# Patient Record
Sex: MALE | Race: BLACK OR AFRICAN AMERICAN | NOT HISPANIC OR LATINO | URBAN - METROPOLITAN AREA
[De-identification: names, ages, dates, MRNs, and addresses within clinical notes are randomized per-mention and may not be internally consistent; named-entity substitution may affect disease eponyms.]

---

## 2022-10-17 ENCOUNTER — WEB ENCOUNTER (OUTPATIENT)
Dept: URBAN - METROPOLITAN AREA CLINIC 92 | Facility: CLINIC | Age: 82
End: 2022-10-17

## 2022-10-17 ENCOUNTER — OFFICE VISIT (OUTPATIENT)
Dept: URBAN - METROPOLITAN AREA CLINIC 92 | Facility: CLINIC | Age: 82
End: 2022-10-17
Payer: MEDICARE

## 2022-10-17 VITALS
WEIGHT: 210 LBS | DIASTOLIC BLOOD PRESSURE: 72 MMHG | TEMPERATURE: 97 F | BODY MASS INDEX: 26.95 KG/M2 | SYSTOLIC BLOOD PRESSURE: 142 MMHG | HEART RATE: 68 BPM | HEIGHT: 74 IN

## 2022-10-17 DIAGNOSIS — R19.7 DIARRHEA, UNSPECIFIED: ICD-10-CM

## 2022-10-17 PROCEDURE — 99204 OFFICE O/P NEW MOD 45 MIN: CPT

## 2022-10-17 RX ORDER — FUROSEMIDE 40 MG/1
1 TABLET TABLET ORAL ONCE A DAY
Status: ACTIVE | COMMUNITY

## 2022-10-17 RX ORDER — KRILL/OM-3/DHA/EPA/PHOSPHO/AST 1000-230MG
1 TABLET CAPSULE ORAL ONCE A DAY
Status: ACTIVE | COMMUNITY

## 2022-10-17 RX ORDER — LABETALOL HYDROCHLORIDE 200 MG/1
1 TABLET TABLET ORAL TWICE A DAY
Status: ACTIVE | COMMUNITY

## 2022-10-17 RX ORDER — BICALUTAMIDE 50 MG/1
1 TABLET TABLET ORAL ONCE A DAY
Status: ACTIVE | COMMUNITY

## 2022-10-17 NOTE — HPI-TODAY'S VISIT:
83 yo male presents today with complaints of diarrhea.  He was last seen in 2012 due to chronic diarrhea and screening colonoscopy.  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  Two 3 mm hyperplastic polyps were also found, small IH.  He did follow-up after the procedure and it was suggested he start Rowasa enema as well as serologies with CBC, CRP, sed rate and Prometheus panel at that time patient said he was unable to take enema due to high co-pay.  At that time he was experiencing 5-6 liquid bowel movements per day without any overt bleeding.  Prometheus panel was not consistent with inflammatory bowel disease however pathology and endoscopic finding is highly suggestive.  Has not his other labs were also normal he failed to follow-up after this visit.  Currently states he has been having diarrhea for 3 weeks and saw his PCP who gave him Cipro and Lomotil.  He states he did not complete his Cipro course.  He states if he has 1 bowel movement that is loose he will not have any more t/o the day but states if he does not he will have some incontinence after coughing or sneezing. He cannot tell me how many exact BM he has. Denies melena, hematochezia.  He has tried imodium prn with relief. He has had some weight loss due to decreased intake of food since he has diarrhea right after eating.  Denies any new medications or antibiotics since onset, recent travel.  Denies abdominal pain, nausea, vomiting, fevers, chills.  He has not had any stool studies.  He denies any family history of colon cancer, colon polyps, ulcerative colitis.  He does note that his son has Crohn's disease.  Per patient he had a colonoscopy in 2019 with Dr. Paez at [place] and he was told that he had 2 polyps and thinks he was supposed to repeat this in 5 years.  He does not know why he was seen a colorectal doctor.  He notes that prior to this episode he would have 1 bowel movement daily that was soft and formed and cannot member the last time he did have diarrhea.  He denies any upper GI symptoms, NSAID use.

## 2022-10-18 ENCOUNTER — LAB OUTSIDE AN ENCOUNTER (OUTPATIENT)
Dept: URBAN - METROPOLITAN AREA CLINIC 92 | Facility: CLINIC | Age: 82
End: 2022-10-18

## 2022-10-19 ENCOUNTER — TELEPHONE ENCOUNTER (OUTPATIENT)
Dept: URBAN - METROPOLITAN AREA CLINIC 23 | Facility: CLINIC | Age: 82
End: 2022-10-19

## 2022-10-20 ENCOUNTER — TELEPHONE ENCOUNTER (OUTPATIENT)
Dept: URBAN - METROPOLITAN AREA CLINIC 7 | Facility: CLINIC | Age: 82
End: 2022-10-20

## 2022-10-25 ENCOUNTER — TELEPHONE ENCOUNTER (OUTPATIENT)
Dept: URBAN - METROPOLITAN AREA CLINIC 7 | Facility: CLINIC | Age: 82
End: 2022-10-25

## 2022-10-25 LAB — GASTROINTESTINAL PATHOGEN: (no result)

## 2022-10-31 ENCOUNTER — TELEPHONE ENCOUNTER (OUTPATIENT)
Dept: URBAN - METROPOLITAN AREA CLINIC 92 | Facility: CLINIC | Age: 82
End: 2022-10-31

## 2022-10-31 RX ORDER — POLYETHYLENE GLYCOL 3350, SODIUM SULFATE ANHYDROUS, SODIUM BICARBONATE, SODIUM CHLORIDE, POTASSIUM CHLORIDE 236; 22.74; 6.74; 5.86; 2.97 G/4L; G/4L; G/4L; G/4L; G/4L
AS DIRECTED POWDER, FOR SOLUTION ORAL 1
Qty: 1 | Refills: 0 | OUTPATIENT
Start: 2022-10-31 | End: 2022-11-01

## 2022-11-01 LAB
CALPROTECTIN, FECAL: 2350
PANCREATIC ELASTASE, FECAL: >500

## 2022-11-17 ENCOUNTER — TELEPHONE ENCOUNTER (OUTPATIENT)
Dept: URBAN - METROPOLITAN AREA CLINIC 92 | Facility: CLINIC | Age: 82
End: 2022-11-17

## 2022-11-18 ENCOUNTER — OFFICE VISIT (OUTPATIENT)
Dept: URBAN - METROPOLITAN AREA SURGERY CENTER 16 | Facility: SURGERY CENTER | Age: 82
End: 2022-11-18

## 2022-11-18 ENCOUNTER — CLAIMS CREATED FROM THE CLAIM WINDOW (OUTPATIENT)
Dept: URBAN - METROPOLITAN AREA CLINIC 4 | Facility: CLINIC | Age: 82
End: 2022-11-18
Payer: MEDICARE

## 2022-11-18 ENCOUNTER — TELEPHONE ENCOUNTER (OUTPATIENT)
Dept: URBAN - METROPOLITAN AREA CLINIC 92 | Facility: CLINIC | Age: 82
End: 2022-11-18

## 2022-11-18 ENCOUNTER — CLAIMS CREATED FROM THE CLAIM WINDOW (OUTPATIENT)
Dept: URBAN - METROPOLITAN AREA SURGERY CENTER 16 | Facility: SURGERY CENTER | Age: 82
End: 2022-11-18
Payer: MEDICARE

## 2022-11-18 DIAGNOSIS — K51.20 CHRONIC ULCERATIVE PROCTITIS: ICD-10-CM

## 2022-11-18 DIAGNOSIS — R19.7 ACUTE DIARRHEA: ICD-10-CM

## 2022-11-18 DIAGNOSIS — K63.89 OTHER SPECIFIED DISEASES OF INTESTINE: ICD-10-CM

## 2022-11-18 PROCEDURE — G8907 PT DOC NO EVENTS ON DISCHARG: HCPCS | Performed by: INTERNAL MEDICINE

## 2022-11-18 PROCEDURE — 88305 TISSUE EXAM BY PATHOLOGIST: CPT | Performed by: PATHOLOGY

## 2022-11-18 PROCEDURE — 45380 COLONOSCOPY AND BIOPSY: CPT | Performed by: INTERNAL MEDICINE

## 2022-11-18 RX ORDER — LABETALOL HYDROCHLORIDE 200 MG/1
1 TABLET TABLET ORAL TWICE A DAY
Status: ACTIVE | COMMUNITY

## 2022-11-18 RX ORDER — KRILL/OM-3/DHA/EPA/PHOSPHO/AST 1000-230MG
1 TABLET CAPSULE ORAL ONCE A DAY
Status: ACTIVE | COMMUNITY

## 2022-11-18 RX ORDER — BICALUTAMIDE 50 MG/1
1 TABLET TABLET ORAL ONCE A DAY
Status: ACTIVE | COMMUNITY

## 2022-11-18 RX ORDER — FUROSEMIDE 40 MG/1
1 TABLET TABLET ORAL ONCE A DAY
Status: ACTIVE | COMMUNITY

## 2022-11-28 ENCOUNTER — TELEPHONE ENCOUNTER (OUTPATIENT)
Dept: URBAN - METROPOLITAN AREA CLINIC 92 | Facility: CLINIC | Age: 82
End: 2022-11-28

## 2022-12-08 ENCOUNTER — OFFICE VISIT (OUTPATIENT)
Dept: URBAN - METROPOLITAN AREA CLINIC 92 | Facility: CLINIC | Age: 82
End: 2022-12-08

## 2023-01-04 ENCOUNTER — OFFICE VISIT (OUTPATIENT)
Dept: URBAN - METROPOLITAN AREA CLINIC 92 | Facility: CLINIC | Age: 83
End: 2023-01-04

## 2023-01-10 ENCOUNTER — OFFICE VISIT (OUTPATIENT)
Dept: URBAN - METROPOLITAN AREA CLINIC 92 | Facility: CLINIC | Age: 83
End: 2023-01-10

## 2023-01-10 ENCOUNTER — TELEPHONE ENCOUNTER (OUTPATIENT)
Dept: URBAN - METROPOLITAN AREA CLINIC 63 | Facility: CLINIC | Age: 83
End: 2023-01-10

## 2023-01-10 NOTE — HPI-TODAY'S VISIT:
83 yo male presents today with complaints of diarrhea.  He was last seen in 2012 due to chronic diarrhea and screening colonoscopy.  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  Two 3 mm hyperplastic polyps were also found, small IH.  He did follow-up after the procedure and it was suggested he start Rowasa enema as well as serologies with CBC, CRP, sed rate and Prometheus panel at that time patient said he was unable to take enema due to high co-pay.  At that time he was experiencing 5-6 liquid bowel movements per day without any overt bleeding.  Prometheus panel was not consistent with inflammatory bowel disease however pathology and endoscopic finding is highly suggestive.  Has not his other labs were also normal he failed to follow-up after this visit.  Currently states he has been having diarrhea for 3 weeks and saw his PCP who gave him Cipro and Lomotil.  He states he did not complete his Cipro course.  He states if he has 1 bowel movement that is loose he will not have any more t/o the day but states if he does not he will have some incontinence after coughing or sneezing. He cannot tell me how many exact BM he has. Denies melena, hematochezia.  He has tried imodium prn with relief. He has had some weight loss due to decreased intake of food since he has diarrhea right after eating.  Denies any new medications or antibiotics since onset, recent travel.  Denies abdominal pain, nausea, vomiting, fevers, chills.  He has not had any stool studies.  He denies any family history of colon cancer, colon polyps, ulcerative colitis.  He does note that his son has Crohn's disease.  Per patient he had a colonoscopy in 2019 with Dr. Paez at [place] and he was told that he had 2 polyps and thinks he was supposed to repeat this in 5 years.  He does not know why he was seen a colorectal doctor.  He notes that prior to this episode he would have 1 bowel movement daily that was soft and formed and cannot member the last time he did have diarrhea.  He denies any upper GI symptoms, NSAID use.  Colon 11/22: IH, erythematous and inflamed mucosa in recto-sig colon bx consistent with chronic active colitis consistent with UC

## 2023-03-06 ENCOUNTER — TELEPHONE ENCOUNTER (OUTPATIENT)
Dept: URBAN - METROPOLITAN AREA CLINIC 105 | Facility: CLINIC | Age: 83
End: 2023-03-06

## 2023-03-06 ENCOUNTER — OFFICE VISIT (OUTPATIENT)
Dept: URBAN - METROPOLITAN AREA CLINIC 92 | Facility: CLINIC | Age: 83
End: 2023-03-06
Payer: MEDICARE

## 2023-03-06 VITALS
BODY MASS INDEX: 25.67 KG/M2 | HEART RATE: 68 BPM | TEMPERATURE: 97.2 F | WEIGHT: 200 LBS | SYSTOLIC BLOOD PRESSURE: 134 MMHG | HEIGHT: 74 IN | DIASTOLIC BLOOD PRESSURE: 66 MMHG

## 2023-03-06 DIAGNOSIS — K51.30 ULCERATIVE RECTOSIGMOIDITIS WITHOUT COMPLICATION: ICD-10-CM

## 2023-03-06 PROBLEM — 41364008: Status: ACTIVE | Noted: 2023-01-09

## 2023-03-06 PROCEDURE — 99214 OFFICE O/P EST MOD 30 MIN: CPT

## 2023-03-06 RX ORDER — FUROSEMIDE 40 MG/1
1 TABLET TABLET ORAL ONCE A DAY
Status: ACTIVE | COMMUNITY

## 2023-03-06 RX ORDER — LABETALOL HYDROCHLORIDE 200 MG/1
1 TABLET TABLET ORAL TWICE A DAY
Status: ACTIVE | COMMUNITY

## 2023-03-06 RX ORDER — BICALUTAMIDE 50 MG/1
1 TABLET TABLET ORAL ONCE A DAY
Status: ACTIVE | COMMUNITY

## 2023-03-06 RX ORDER — KRILL/OM-3/DHA/EPA/PHOSPHO/AST 1000-230MG
1 TABLET CAPSULE ORAL ONCE A DAY
Status: ACTIVE | COMMUNITY

## 2023-03-06 RX ORDER — MESALAMINE 1.2 G/1
3 TABLETS WITH A MEAL TABLET, DELAYED RELEASE ORAL ONCE A DAY
Qty: 270 TABLET | Refills: 0 | OUTPATIENT
Start: 2023-03-06 | End: 2023-06-04

## 2023-03-06 NOTE — HPI-TODAY'S VISIT:
83 yo male presents today for f/u after colonoscopy.   10/17/2022 He was last seen in 2012 due to chronic diarrhea and screening colonoscopy.  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  Two 3 mm hyperplastic polyps were also found, small IH.  He did follow-up after the procedure and it was suggested he start Rowasa enema as well as serologies with CBC, CRP, sed rate and Prometheus panel at that time patient said he was unable to take enema due to high co-pay.  At that time he was experiencing 5-6 liquid bowel movements per day without any overt bleeding.  Prometheus panel was not consistent with inflammatory bowel disease however pathology and endoscopic finding is highly suggestive.  Has not his other labs were also normal he failed to follow-up after this visit.  Currently states he has been having diarrhea for 3 weeks and saw his PCP who gave him Cipro and Lomotil.  He states he did not complete his Cipro course.  He states if he has 1 bowel movement that is loose he will not have any more t/o the day but states if he does not he will have some incontinence after coughing or sneezing. He cannot tell me how many exact BM he has. Denies melena, hematochezia.  He has tried imodium prn with relief. He has had some weight loss due to decreased intake of food since he has diarrhea right after eating.  Denies any new medications or antibiotics since onset, recent travel.  Denies abdominal pain, nausea, vomiting, fevers, chills.  He has not had any stool studies.  He denies any family history of colon cancer, colon polyps, ulcerative colitis.  He does note that his son has Crohn's disease.  Per patient he had a colonoscopy in 2019 with Dr. Paez at [place] and he was told that he had 2 polyps and thinks he was supposed to repeat this in 5 years.  He does not know why he was seen a colorectal doctor.  He notes that prior to this episode he would have 1 bowel movement daily that was soft and formed and cannot member the last time he did have diarrhea.  He denies any upper GI symptoms, NSAID use.  3/6/23 Since last visit a Colonospcoy was done on 11/22 that demonstrarted IH, erythematous and inflamed mucosa in recto-sig colon bx consistent with chronic active colitis, TI normal and his fecal calpro was 2350.  Currently states that since his colonoscopy he did the Rowasa enema for a month which did some what help his bowel movements.  He would have 3 bowel movements daily but they were still loose.  Denies any hematochezia, melena, weight loss.Abdominal pain, fever, chills.  He also notes decreased sense of urgency with the enema.  He states today that he has not wanting to use the enema anymore and would prefer a oral medication regimen.  Pt has CKD and most recent labs on 1/2023 showed Cr 1.60 and GFR 43
Home

## 2023-03-22 LAB — CALPROTECTIN, FECAL: (no result)

## 2023-04-10 ENCOUNTER — LAB OUTSIDE AN ENCOUNTER (OUTPATIENT)
Dept: URBAN - METROPOLITAN AREA CLINIC 92 | Facility: CLINIC | Age: 83
End: 2023-04-10

## 2023-05-12 ENCOUNTER — TELEPHONE ENCOUNTER (OUTPATIENT)
Dept: URBAN - METROPOLITAN AREA CLINIC 92 | Facility: CLINIC | Age: 83
End: 2023-05-12

## 2023-05-19 ENCOUNTER — LAB OUTSIDE AN ENCOUNTER (OUTPATIENT)
Dept: URBAN - METROPOLITAN AREA CLINIC 92 | Facility: CLINIC | Age: 83
End: 2023-05-19

## 2023-05-24 LAB — CALPROTECTIN, FECAL: 282

## 2023-06-05 ENCOUNTER — TELEPHONE ENCOUNTER (OUTPATIENT)
Dept: URBAN - METROPOLITAN AREA CLINIC 92 | Facility: CLINIC | Age: 83
End: 2023-06-05

## 2023-06-05 ENCOUNTER — OFFICE VISIT (OUTPATIENT)
Dept: URBAN - METROPOLITAN AREA CLINIC 92 | Facility: CLINIC | Age: 83
End: 2023-06-05

## 2023-06-06 ENCOUNTER — ERX REFILL RESPONSE (OUTPATIENT)
Dept: URBAN - METROPOLITAN AREA CLINIC 92 | Facility: CLINIC | Age: 83
End: 2023-06-06

## 2023-06-06 RX ORDER — MESALAMINE 1.2 G/1
TAKE 3 TABLETS BY MOUTH WITH MEALS ONCE DAILY TABLET, DELAYED RELEASE ORAL
Qty: 270 TABLET | Refills: 1 | OUTPATIENT

## 2023-06-06 RX ORDER — MESALAMINE 1.2 G/1
TAKE 3 TABLETS BY MOUTH WITH MEALS ONCE DAILY TABLET, DELAYED RELEASE ORAL
Qty: 270 TABLET | Refills: 0 | OUTPATIENT

## 2023-06-07 ENCOUNTER — TELEPHONE ENCOUNTER (OUTPATIENT)
Dept: URBAN - METROPOLITAN AREA CLINIC 92 | Facility: CLINIC | Age: 83
End: 2023-06-07

## 2023-06-17 LAB
A/G RATIO: 1.2
ALBUMIN: 3.6
ALKALINE PHOSPHATASE: 77
ALT (SGPT): 3
AST (SGOT): 7
BILIRUBIN, TOTAL: 0.5
BUN/CREATININE RATIO: 14
BUN: 27
CALCIUM: 7.7
CARBON DIOXIDE, TOTAL: 26
CHLORIDE: 105
CREATININE: 2
EGFR: 33
GLOBULIN, TOTAL: 2.9
GLUCOSE: 77
POTASSIUM: 5.2
PROTEIN, TOTAL: 6.5
SODIUM: 140

## 2023-07-06 ENCOUNTER — OFFICE VISIT (OUTPATIENT)
Dept: URBAN - METROPOLITAN AREA CLINIC 92 | Facility: CLINIC | Age: 83
End: 2023-07-06
Payer: MEDICARE

## 2023-07-06 VITALS
TEMPERATURE: 97.1 F | HEIGHT: 74 IN | HEART RATE: 70 BPM | WEIGHT: 198 LBS | BODY MASS INDEX: 25.41 KG/M2 | DIASTOLIC BLOOD PRESSURE: 57 MMHG | SYSTOLIC BLOOD PRESSURE: 126 MMHG

## 2023-07-06 DIAGNOSIS — K51.30 ULCERATIVE RECTOSIGMOIDITIS WITHOUT COMPLICATION: ICD-10-CM

## 2023-07-06 PROCEDURE — 99213 OFFICE O/P EST LOW 20 MIN: CPT

## 2023-07-06 RX ORDER — BICALUTAMIDE 50 MG/1
1 TABLET TABLET ORAL ONCE A DAY
Status: ACTIVE | COMMUNITY

## 2023-07-06 RX ORDER — LABETALOL HYDROCHLORIDE 200 MG/1
1 TABLET TABLET ORAL TWICE A DAY
Status: ACTIVE | COMMUNITY

## 2023-07-06 RX ORDER — MESALAMINE 1.2 G/1
2 TABLETS WITH A MEAL TABLET, DELAYED RELEASE ORAL ONCE A DAY
Qty: 180 TABLET | Refills: 1

## 2023-07-06 RX ORDER — MESALAMINE 1.2 G/1
TAKE 3 TABLETS BY MOUTH WITH MEALS ONCE DAILY TABLET, DELAYED RELEASE ORAL
Qty: 270 TABLET | Refills: 0 | Status: ACTIVE | COMMUNITY

## 2023-07-06 RX ORDER — FUROSEMIDE 40 MG/1
1 TABLET TABLET ORAL ONCE A DAY
Status: ACTIVE | COMMUNITY

## 2023-07-06 RX ORDER — KRILL/OM-3/DHA/EPA/PHOSPHO/AST 1000-230MG
1 TABLET CAPSULE ORAL ONCE A DAY
Status: ACTIVE | COMMUNITY

## 2023-07-06 NOTE — HPI-TODAY'S VISIT:
83 yo male presents today for f/u after colonoscopy.   10/17/2022 He was last seen in 2012 due to chronic diarrhea and screening colonoscopy.  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  Two 3 mm hyperplastic polyps were also found, small IH.  He did follow-up after the procedure and it was suggested he start Rowasa enema as well as serologies with CBC, CRP, sed rate and Prometheus panel at that time patient said he was unable to take enema due to high co-pay.  At that time he was experiencing 5-6 liquid bowel movements per day without any overt bleeding.  Prometheus panel was not consistent with inflammatory bowel disease however pathology and endoscopic finding is highly suggestive.  Has not his other labs were also normal he failed to follow-up after this visit.  Currently states he has been having diarrhea for 3 weeks and saw his PCP who gave him Cipro and Lomotil.  He states he did not complete his Cipro course.  He states if he has 1 bowel movement that is loose he will not have any more t/o the day but states if he does not he will have some incontinence after coughing or sneezing. He cannot tell me how many exact BM he has. Denies melena, hematochezia.  He has tried imodium prn with relief. He has had some weight loss due to decreased intake of food since he has diarrhea right after eating.  Denies any new medications or antibiotics since onset, recent travel.  Denies abdominal pain, nausea, vomiting, fevers, chills.  He has not had any stool studies.  He denies any family history of colon cancer, colon polyps, ulcerative colitis.  He does note that his son has Crohn's disease.  Per patient he had a colonoscopy in 2019 with Dr. Paez at [place] and he was told that he had 2 polyps and thinks he was supposed to repeat this in 5 years.  He does not know why he was seen a colorectal doctor.  He notes that prior to this episode he would have 1 bowel movement daily that was soft and formed and cannot member the last time he did have diarrhea.  He denies any upper GI symptoms, NSAID use.  3/6/23 Since last visit a Colonospcoy was done on 11/22 that demonstrarted IH, erythematous and inflamed mucosa in recto-sig colon bx consistent with chronic active colitis, TI normal and his fecal calpro was 2350.  Currently states that since his colonoscopy he did the Rowasa enema for a month which did some what help his bowel movements.  He would have 3 bowel movements daily but they were still loose.  Denies any hematochezia, melena, weight loss.Abdominal pain, fever, chills.  He also notes decreased sense of urgency with the enema.  He states today that he has not wanting to use the enema anymore and would prefer a oral medication regimen.  Pt has CKD and most recent labs on 1/2023 showed Cr 1.60 and GFR 43  7/6/23 Since last visit fecal calprotectin 5- demonstrated 282, CMP demonstrated creatinine 2, GFR 33, BUN 27. Last saw Nephrologist Dr. Crystal Oneill at [place] 3 months ago. Next visit in August.  Patient notes he lost 2 lbs since his last visit. He notes he doesn't eat his first meal until 3pm and then a second meal late at night. He reports he does feel hungry in the mornings. Doing well on mesalamine. He has a BM QOD. Denies hematochezia, melena, abdominal pain, dysphagia, reflux. He is sedentary most of the day, his daughter reports. She is trying to her him to drink more water.

## 2023-07-24 ENCOUNTER — TELEPHONE ENCOUNTER (OUTPATIENT)
Dept: URBAN - METROPOLITAN AREA CLINIC 92 | Facility: CLINIC | Age: 83
End: 2023-07-24

## 2023-07-28 ENCOUNTER — DASHBOARD ENCOUNTERS (OUTPATIENT)
Age: 83
End: 2023-07-28

## 2023-09-14 ENCOUNTER — OFFICE VISIT (OUTPATIENT)
Dept: URBAN - METROPOLITAN AREA TELEHEALTH 2 | Facility: TELEHEALTH | Age: 83
End: 2023-09-14

## 2023-09-14 VITALS — WEIGHT: 198 LBS | BODY MASS INDEX: 25.41 KG/M2 | HEIGHT: 74 IN

## 2023-09-14 RX ORDER — LABETALOL HYDROCHLORIDE 200 MG/1
1 TABLET TABLET ORAL TWICE A DAY
Status: ACTIVE | COMMUNITY

## 2023-09-14 RX ORDER — KRILL/OM-3/DHA/EPA/PHOSPHO/AST 1000-230MG
1 TABLET CAPSULE ORAL ONCE A DAY
Status: ACTIVE | COMMUNITY

## 2023-09-14 RX ORDER — BICALUTAMIDE 50 MG/1
1 TABLET TABLET ORAL ONCE A DAY
Status: ACTIVE | COMMUNITY

## 2023-09-14 RX ORDER — FUROSEMIDE 40 MG/1
1 TABLET TABLET ORAL ONCE A DAY
Status: ACTIVE | COMMUNITY

## 2023-09-14 RX ORDER — MESALAMINE 1.2 G/1
2 TABLETS WITH A MEAL TABLET, DELAYED RELEASE ORAL ONCE A DAY
Qty: 180 TABLET | Refills: 1 | Status: ACTIVE | COMMUNITY

## 2023-10-27 ENCOUNTER — ERX REFILL RESPONSE (OUTPATIENT)
Dept: URBAN - METROPOLITAN AREA CLINIC 92 | Facility: CLINIC | Age: 83
End: 2023-10-27

## 2023-10-27 RX ORDER — MESALAMINE 1.2 G/1
2 TABLETS WITH A MEAL TABLET, DELAYED RELEASE ORAL ONCE A DAY
Qty: 180 TABLET | Refills: 1 | OUTPATIENT